# Patient Record
Sex: FEMALE | Race: WHITE | Employment: PART TIME | URBAN - METROPOLITAN AREA
[De-identification: names, ages, dates, MRNs, and addresses within clinical notes are randomized per-mention and may not be internally consistent; named-entity substitution may affect disease eponyms.]

---

## 2017-01-16 ENCOUNTER — TELEPHONE (OUTPATIENT)
Dept: RHEUMATOLOGY | Facility: CLINIC | Age: 67
End: 2017-01-16

## 2017-01-16 DIAGNOSIS — Z51.81 ENCOUNTER FOR THERAPEUTIC DRUG MONITORING: ICD-10-CM

## 2017-01-16 DIAGNOSIS — M05.79 SEROPOSITIVE RHEUMATOID ARTHRITIS OF MULTIPLE SITES (HCC): Primary | ICD-10-CM

## 2017-01-16 NOTE — TELEPHONE ENCOUNTER
Pt called in and booked a follow up appt with Dr. Liya Toussaint for 2/20 and is asking if lab orders can be ordered and mailed to her home, please (mailing address verified).

## 2017-01-17 NOTE — TELEPHONE ENCOUNTER
Changed orders for [facility] - no need for uric acid and magnesium. Set for 6 months - will print out tomorrow for pt.  To mail to her

## 2017-02-20 ENCOUNTER — OFFICE VISIT (OUTPATIENT)
Dept: RHEUMATOLOGY | Facility: CLINIC | Age: 67
End: 2017-02-20

## 2017-02-20 VITALS
WEIGHT: 176.5 LBS | HEART RATE: 73 BPM | HEIGHT: 64 IN | DIASTOLIC BLOOD PRESSURE: 94 MMHG | BODY MASS INDEX: 30.13 KG/M2 | SYSTOLIC BLOOD PRESSURE: 151 MMHG

## 2017-02-20 DIAGNOSIS — M06.9 RHEUMATOID ARTHRITIS INVOLVING MULTIPLE SITES, UNSPECIFIED RHEUMATOID FACTOR PRESENCE: Primary | ICD-10-CM

## 2017-02-20 DIAGNOSIS — Z51.81 THERAPEUTIC DRUG MONITORING: ICD-10-CM

## 2017-02-20 PROCEDURE — 99212 OFFICE O/P EST SF 10 MIN: CPT | Performed by: INTERNAL MEDICINE

## 2017-02-20 PROCEDURE — 99213 OFFICE O/P EST LOW 20 MIN: CPT | Performed by: INTERNAL MEDICINE

## 2017-02-20 RX ORDER — MELOXICAM 15 MG/1
15 TABLET ORAL
Refills: 1 | COMMUNITY
Start: 2016-12-30 | End: 2017-02-20

## 2017-02-20 RX ORDER — MELOXICAM 15 MG/1
15 TABLET ORAL
Qty: 90 TABLET | Refills: 1 | Status: SHIPPED | OUTPATIENT
Start: 2017-02-20 | End: 2017-05-21

## 2017-02-20 NOTE — PATIENT INSTRUCTIONS
1. Cont. Natural medicaitons -     2. Cont. meloxicam 15mg a day   3. Check labs at this time  -   4. Return to clinic in 4 -6months   5.  Takes curcumin,

## 2017-02-20 NOTE — PROGRESS NOTES
Daksha Nixon is a 61year old female. HPI:   Patient presents with:  Knee Pain: right knee pain worse when walking  Ankle Pain: right       I had the pleasure of seeing Daksha Nixon  On 2/20/2017. I had last seen her on 8/17/2016.  I had last it.   She has no nausea with it. She can say she feels better a little bit. artie hasd lower back pain for 3-4 weeks like her riht knee but it's better. Now her right knee is still hurting. HEr right ankle is better.    She is taking meloxicam as needed - f taking mnatural medications till. She is taking it with mint and that helps. She wakes up with right hsoudler, righ thip and knee at night. She still has good and bad days. She feels her right knee is swollen.    Especially at night she is miserable and OR) Take  by mouth. Disp:  Rfl:    Omega 3 1000 MG Oral Cap Take 3,000 mg by mouth daily. Disp:  Rfl:    Ibuprofen (ADVIL) 200 MG Oral Cap Take 200 mg by mouth as needed.  Disp:  Rfl:      .cmed  Allergies:    Codeine                 Other (See Comments) - cotn. meloxicam 15mg a day and flexeril 10mg at ngt prn  - she has overlapping fibromyalgia - doing much better with physical therapy   - she's having trouble walking.   - her labs are stable off meds - and she's not tolerant to a few meds.    - rtc i

## 2017-03-30 ENCOUNTER — TELEPHONE (OUTPATIENT)
Dept: RHEUMATOLOGY | Facility: CLINIC | Age: 67
End: 2017-03-30

## 2017-05-19 ENCOUNTER — TELEPHONE (OUTPATIENT)
Dept: RHEUMATOLOGY | Facility: CLINIC | Age: 67
End: 2017-05-19

## 2017-05-19 DIAGNOSIS — M06.9 RHEUMATOID ARTHRITIS INVOLVING MULTIPLE SITES, UNSPECIFIED RHEUMATOID FACTOR PRESENCE: Primary | ICD-10-CM

## 2017-05-19 DIAGNOSIS — Z51.81 THERAPEUTIC DRUG MONITORING: ICD-10-CM

## 2017-05-19 RX ORDER — LEFLUNOMIDE 10 MG/1
10 TABLET ORAL DAILY
Qty: 30 TABLET | Refills: 1 | Status: SHIPPED | OUTPATIENT
Start: 2017-05-19 | End: 2017-06-30

## 2017-05-19 NOTE — TELEPHONE ENCOUNTER
D/w pt. About leflunomide - she will start 10mg aday - and d/ wher she has to come for appt. In 4-6 weeks .    Please mail her a copy of lab order - for her to do before she comes in

## 2017-05-19 NOTE — TELEPHONE ENCOUNTER
Pt stts she would like to try Leflunomide tablets       Pt stts at this was discussed at [facility]. Please advise.

## 2017-06-30 ENCOUNTER — OFFICE VISIT (OUTPATIENT)
Dept: RHEUMATOLOGY | Facility: CLINIC | Age: 67
End: 2017-06-30

## 2017-06-30 VITALS
DIASTOLIC BLOOD PRESSURE: 91 MMHG | SYSTOLIC BLOOD PRESSURE: 135 MMHG | HEIGHT: 64 IN | BODY MASS INDEX: 29.71 KG/M2 | WEIGHT: 174 LBS | HEART RATE: 79 BPM

## 2017-06-30 DIAGNOSIS — M06.9 RHEUMATOID ARTHRITIS INVOLVING MULTIPLE SITES, UNSPECIFIED RHEUMATOID FACTOR PRESENCE: Primary | ICD-10-CM

## 2017-06-30 DIAGNOSIS — M79.7 FIBROMYALGIA: ICD-10-CM

## 2017-06-30 PROCEDURE — 99214 OFFICE O/P EST MOD 30 MIN: CPT | Performed by: INTERNAL MEDICINE

## 2017-06-30 PROCEDURE — 99212 OFFICE O/P EST SF 10 MIN: CPT | Performed by: INTERNAL MEDICINE

## 2017-06-30 RX ORDER — MONTELUKAST SODIUM 10 MG/1
TABLET ORAL
COMMUNITY
Start: 2017-06-14 | End: 2017-06-30

## 2017-06-30 RX ORDER — MELOXICAM 15 MG/1
15 TABLET ORAL DAILY
Qty: 90 TABLET | Refills: 1 | Status: SHIPPED | OUTPATIENT
Start: 2017-06-30 | End: 2017-09-28

## 2017-06-30 RX ORDER — CYCLOBENZAPRINE HCL 10 MG
10 TABLET ORAL 2 TIMES DAILY
Qty: 60 TABLET | Refills: 2 | Status: SHIPPED | OUTPATIENT
Start: 2017-06-30 | End: 2019-07-03

## 2017-06-30 NOTE — PROGRESS NOTES
Svetlana Sanchez is a 61year old female. HPI:   Patient presents with:  Rheumatoid Arthritis: knee,ankle and shoulder pain      I had the pleasure of seeing Svetlana Sanchez  On 6/30/2017. I had last seen her on 2/20/2017.  I had last seen her on 8/ doing well handling it. She has no nausea with it. She can say she feels better a little bit. artie hasd lower back pain for 3-4 weeks like her riht knee but it's better. Now her right knee is still hurting. HEr right ankle is better.    She is taking josy much. She has been taking mnatural medications till. She is taking it with mint and that helps. She wakes up with right hsoudler, righ thip and knee at night. She still has good and bad days. She feels her right knee is swollen.    Especially at night s cap two times a day Disp:  Rfl:    Rosuvastatin Calcium (CRESTOR) 5 MG Oral Tab Take 5 mg by mouth nightly. Disp:  Rfl:    Probiotic Product (ALIGN) 4 MG Oral Cap Take  by mouth.  Disp:  Rfl:    alprazolam (XANAX) 0.25 MG Oral Tab Take 0.25 mg by mouth sophia Seropositive RA - with positive RF, neg anti CCP - was lost to follow up for 2 years - on natural medication now, declines DMARDS at this time   - she hwanst to see for another 4 months - if pain increases she will try othe rmedications -   - would like to

## 2017-06-30 NOTE — PATIENT INSTRUCTIONS
1. Cont. Natural medicaitons -     2. Cont. meloxicam 15mg a day   3. Consider biologics on next visit if she wants to start a medication -   4. Return to clinic in 4-6 months   5. Takes curcumin,   6. Ok to incresae fish oil to 3000mg a day -   7.  Cyclobe

## 2019-07-03 ENCOUNTER — OFFICE VISIT (OUTPATIENT)
Dept: RHEUMATOLOGY | Facility: CLINIC | Age: 69
End: 2019-07-03
Payer: MEDICARE

## 2019-07-03 VITALS
DIASTOLIC BLOOD PRESSURE: 89 MMHG | SYSTOLIC BLOOD PRESSURE: 155 MMHG | WEIGHT: 172 LBS | BODY MASS INDEX: 29.37 KG/M2 | HEART RATE: 71 BPM | HEIGHT: 64 IN

## 2019-07-03 DIAGNOSIS — M06.9 RHEUMATOID ARTHRITIS INVOLVING MULTIPLE SITES, UNSPECIFIED RHEUMATOID FACTOR PRESENCE: Primary | ICD-10-CM

## 2019-07-03 DIAGNOSIS — E55.9 VITAMIN D DEFICIENCY: ICD-10-CM

## 2019-07-03 DIAGNOSIS — M79.7 FIBROMYALGIA: ICD-10-CM

## 2019-07-03 PROCEDURE — 99214 OFFICE O/P EST MOD 30 MIN: CPT | Performed by: INTERNAL MEDICINE

## 2019-07-03 PROCEDURE — G0463 HOSPITAL OUTPT CLINIC VISIT: HCPCS | Performed by: INTERNAL MEDICINE

## 2019-07-03 RX ORDER — COLLAGEN, HYDROLYSATE (BOVINE) 100 %
POWDER (GRAM) MISCELLANEOUS
COMMUNITY

## 2019-07-03 RX ORDER — MELOXICAM 15 MG/1
15 TABLET ORAL DAILY
Qty: 90 TABLET | Refills: 1 | Status: SHIPPED | OUTPATIENT
Start: 2019-07-03 | End: 2020-03-20

## 2019-07-03 RX ORDER — CYCLOBENZAPRINE HCL 5 MG
TABLET ORAL
Qty: 180 TABLET | Refills: 1 | Status: SHIPPED | OUTPATIENT
Start: 2019-07-03

## 2019-07-03 NOTE — PROGRESS NOTES
Rosio Clemons is a 79year old female. HPI:   Patient presents with: Follow - Up  Leg Pain: Pt slipped, is now in PT  Rheumatoid Arthritis: Finger pain, knee pain      I had the pleasure of seeing Rosio Clemons  On 6/30/2017.  I had last seen week and she increased gradually and she is doing well handling it. She has no nausea with it. She can say she feels better a little bit. artie hasd lower back pain for 3-4 weeks like her riht knee but it's better. Now her right knee is still hurting.  HE better. But she didn't feel that the gel helps much. She has been taking mnatural medications till. She is taking it with mint and that helps. She wakes up with right hsoudler, righ thip and knee at night. She still has good and bad days.    She feels her today's visit):    Current Outpatient Medications:  Collagen Hydrolysate Does not apply Powder by Does not apply route. Disp:  Rfl:    Cyclobenzaprine HCl 10 MG Oral Tab Take 1 tablet (10 mg total) by mouth 2 (two) times daily.  PRN Disp: 60 tablet Rfl: 2 tenderness, good ROM of neck,   No rashes   CVS: RRR, no murmurs  RS: CTAB, no crackles, no rhonchi  ABD: Soft Non tender,   Joint exam:  mild tender in both  shoulder  bilat 3rd pip has mild  swelilng  -   Can close both hands easily -   Right  Knee midl 20mg a day, probitoics helps her -   6. She is going on disability - she was seen by another physician - to get evaluation, and paper from [employer]   7. Sudha Lazo last year in 2015 - and hand left hand surgery -   8.  Right ankle fracture 3/2019 - d/w her

## 2019-07-03 NOTE — PATIENT INSTRUCTIONS
1. Cont. Natural medicaitons -     2. Cont. meloxicam 15mg a day and cylcobenazaprine 5-10mg a day -   3. Return to clinic in 6-7 months   4. Check labs   5. Cont. curcumin,   6. Cont. fish oil  3000mg a day - 5000units a day   7.  Check with dr. Charlotte Quinones if

## 2019-09-25 ENCOUNTER — TELEPHONE (OUTPATIENT)
Dept: RHEUMATOLOGY | Facility: CLINIC | Age: 69
End: 2019-09-25

## 2019-09-26 ENCOUNTER — TELEPHONE (OUTPATIENT)
Dept: RHEUMATOLOGY | Facility: CLINIC | Age: 69
End: 2019-09-26

## 2019-09-26 RX ORDER — CHOLECALCIFEROL (VITAMIN D3) 1250 MCG
CAPSULE ORAL
Qty: 12 CAPSULE | Refills: 0 | Status: SHIPPED | OUTPATIENT
Start: 2019-09-26

## 2019-09-27 NOTE — TELEPHONE ENCOUNTER
Please let her know that her inflammation markers are both normal, done in [place] on September 21st of 2019, ordered by Dr. Gordon Solitario. Her vitamin D level was low at 24. It needs to be greater than 30.   I therefore sent a prescription to her pharma

## 2019-09-27 NOTE — TELEPHONE ENCOUNTER
Pt called back,  verified, and pt was notified of results/recommendations below. Pt verbalized understanding. Pt is requesting a copy of results to be mailed to her (address verified).  Results not available in media tab at this time, pt aware office

## 2019-10-03 NOTE — TELEPHONE ENCOUNTER
Pt aware her results from 9/21 are still not scanned into chart. [facility] ([phone]); recent labs will be faxed to .

## 2020-03-20 DIAGNOSIS — M06.9 RHEUMATOID ARTHRITIS, INVOLVING UNSPECIFIED SITE, UNSPECIFIED RHEUMATOID FACTOR PRESENCE: Primary | ICD-10-CM

## 2020-03-20 DIAGNOSIS — Z51.81 THERAPEUTIC DRUG MONITORING: ICD-10-CM

## 2020-03-20 RX ORDER — MELOXICAM 15 MG/1
15 TABLET ORAL DAILY
Qty: 90 TABLET | Refills: 1 | Status: SHIPPED | OUTPATIENT
Start: 2020-03-20

## 2020-03-20 NOTE — TELEPHONE ENCOUNTER
Remind pt. To get her labs done in 1 month  and make follow up appt. In July 2020   - will put in orders for kidney and liver funciton tests, if she wants to get them at [facility] - please mail her the orders.

## 2020-03-25 NOTE — TELEPHONE ENCOUNTER
Left message to have labs done in 1 month and schedule follow up in 7/2020. Lab orders mailed to pt address on file.

## (undated) NOTE — Clinical Note
February 21, 2017         [address] [place] [address]      Patient: Hans Luo   YOB: 1950   Date of Visit: 2/20/2017       Dear Dr. Rashawn Ryan,    I saw your patient, Hans Luo, on 2/20/2017.  Encl after walking for 4-5 hours. She quit working in May 2015.    She still doesn't sleep well. It's traveling and since 2013 she can't sleep on her sides.  She sleeps on her back.    She stopped Zocor and feels a little better but also getting PT so doesn't kn The pain is up and down. She has to move sometimes and if she walks too much she has pain. She has 8/10 pain max. During flares. She's not 100%.     She is trying natural medications.    She does take mobic regularly and she uses the voltaren gel 1%.    Ladarius - has back pain - cont.  physical therapy - melxoicam 15 mg a day and flexeril 10 mg at night,    - she will bring in list of her natural med - primrose - tumeric bromelain    - d/w her again about med's - she still wants natural - she has had lots of side

## (undated) NOTE — LETTER
July 7, 2017         Britni Mckee [facility] [address]  [address] [facility]      Patient: Mathew Le   YOB: 1950   Date of Visit: 6/30/2017       Dear Dr. Ulysses Bernal,    I saw your patient, Mathew Le, on 6/30/2017.  Enclosed sleep on her sides. She sleeps on her back. She stopped zocor and feels a little better but also getting PT so doesn't know if that is helping with massage. She has stiffness in morning more than 1 hour.  She has trouble closing completely her right hand She is trying natural medications. She does take mobic regularly and she uses the voltaren gel 1%. Sometimes the weather really bothers her. She is on omega 3, healthy joint and regenration and uric acid. She feels it's much better on her stomach.  Sh - she also treid hcq and ssz - side effects   - palpiatioans and headache ffrom prednisone -   -  prilosec 20mg capsule to help her stomach prn   - cotn.  meloxicam 15mg a day and flexeril 10mg at ngt prn  - she has overlapping fibromyalgia - doing much b

## (undated) NOTE — LETTER
06/25/20        [address]  [address] [place] [id number]      Dear Oriana Gordon,    [facility] records indicate that you have outstanding lab work and or testing that was ordered for you and has not yet been completed:  Orders Placed This Encounter

## (undated) NOTE — MR AVS SNAPSHOT
[address] [address]  [phone]               Thank you for choosing us for your health care visit with Roderick Christianson MD.  We are glad to serve you and happy to provide you with this gan Take 1 tablet (10 mg total) by mouth 2 (two) times daily. PRN   Commonly known as:  cyclobenzaprine           * Diclofenac Sodium 1 % Gel   Apply topically 4 (four) times daily.    Commonly known as:  VOLTAREN           * Diclofenac Sodium 1 % Gel   Apply 4 Assoc Dx:  Rheumatoid arthritis involving multiple sites, unspecified rheumatoid factor presence ([facility] [M06.9], Therapeutic drug monitoring [Z51.81]                 MyChart     Call the [employer] for assistance with your inactive [employer] account    If you Eat plenty of protein, keep the fat content low Sugars:  sodas and sports drinks, candies and desserts   Eat plenty of low-fat dairy products High fat meats and dairy   Choose whole grain products Foods high in sodium   Water is best for hydration Fast jose